# Patient Record
Sex: MALE | Race: AMERICAN INDIAN OR ALASKA NATIVE | Employment: UNEMPLOYED | URBAN - METROPOLITAN AREA
[De-identification: names, ages, dates, MRNs, and addresses within clinical notes are randomized per-mention and may not be internally consistent; named-entity substitution may affect disease eponyms.]

---
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
## 2025-07-09 NOTE — PROGRESS NOTES
Assessment/Plan:     15 yo M here for well    We gave 11 year shots- Tdap, MCV, HPV #1 and flu  Return in 6 mo for HPV #2     Diagnoses and all orders for this visit:    Healthcare maintenance  -     HPV VACCINE 9 VALENT IM  -     MENINGOCOCCAL CONJUGATE VACCINE MCV4P IM  -     TDAP VACCINE GREATER THAN OR EQUAL TO 8YO IM  -     FLU VACCINE QUADRIVALENT GREATER THAN OR EQUAL TO 3 YO IM          Subjective:     Patient ID: Anirudh Ward is a 15 y o  male  Well Child Assessment:  History was provided by the mother  Fina Knutson lives with his mother, father and sister  Nutrition  Types of intake include fruits, vegetables and cow's milk (1 fruit, 5, 8 oz  2% milk, occasional juice, no soda)  Dental  The patient has a dental home  The patient brushes teeth regularly  The patient does not floss regularly  Last dental exam was less than 6 months ago  Elimination  Elimination problems do not include constipation, diarrhea or urinary symptoms  Behavioral  Behavioral issues do not include hitting, lying frequently, misbehaving with peers or misbehaving with siblings  Disciplinary methods include consistency among caregivers  Sleep  Average sleep duration is 11 hours  The patient does not snore  There are no sleep problems  Safety  There is no smoking in the home  Home has working smoke alarms? yes  Home has working carbon monoxide alarms? don't know  There is no gun in home  School  Current school district is 5th grade [employer]  There are no signs of learning disabilities  Child is performing acceptably in school  Screening  There are no risk factors for tuberculosis  There are no risk factors related to personal safety  Social  The caregiver enjoys the child  After school, the child is at home with a parent  Sibling interactions are good  The child spends 1 hour in front of a screen (tv or computer) per day  Here w/ mom for a well visit  Did not have 11 year visit  No concerns today  Device check only   Review of Systems   Constitutional: Negative for activity change  HENT: Negative for congestion  Eyes: Negative for discharge  Respiratory: Negative for cough and shortness of breath  Cardiovascular: Negative for chest pain  Gastrointestinal: Negative for abdominal pain  Endocrine: Negative  Genitourinary: Negative  Musculoskeletal: Negative  Skin: Negative for rash  Neurological: Negative for headaches  Hematological: Negative  Psychiatric/Behavioral: Negative for behavioral problems  Objective:     Physical Exam   Constitutional: He appears well-developed and well-nourished  HENT:   Head: Normocephalic  Right Ear: Tympanic membrane and canal normal    Left Ear: Tympanic membrane and canal normal    Nose: Nose normal    Mouth/Throat: Mucous membranes are moist  Oropharynx is clear  Eyes: Conjunctivae, EOM and lids are normal  Pupils are equal, round, and reactive to light  Neck: Neck supple  Cardiovascular: Normal rate and regular rhythm  No murmur (No murmurs heard ) heard  Pulses:       Femoral pulses are 2+ on the right side, and 2+ on the left side  Pulmonary/Chest: Effort normal and breath sounds normal  There is normal air entry  No respiratory distress  Abdominal: Soft  Bowel sounds are normal  He exhibits no distension  There is no hepatosplenomegaly  There is no tenderness  Genitourinary: Penis normal    Musculoskeletal: Normal range of motion  Muscle tone seems to be normal   No joint swelling noted  No deficit noted  No abnormality noted  Neurological: He is alert  No cranial nerve deficit  No neurological deficit noted   Skin: Skin is warm  Capillary refill takes less than 3 seconds  He is not diaphoretic  No cyanosis  No jaundice